# Patient Record
Sex: FEMALE | Race: WHITE | Employment: FULL TIME | URBAN - METROPOLITAN AREA
[De-identification: names, ages, dates, MRNs, and addresses within clinical notes are randomized per-mention and may not be internally consistent; named-entity substitution may affect disease eponyms.]

---

## 2017-09-08 ENCOUNTER — OFFICE VISIT (OUTPATIENT)
Dept: FAMILY MEDICINE CLINIC | Age: 32
End: 2017-09-08
Payer: COMMERCIAL

## 2017-09-08 VITALS
HEIGHT: 66 IN | WEIGHT: 191.2 LBS | RESPIRATION RATE: 12 BRPM | DIASTOLIC BLOOD PRESSURE: 74 MMHG | BODY MASS INDEX: 30.73 KG/M2 | TEMPERATURE: 97.9 F | SYSTOLIC BLOOD PRESSURE: 110 MMHG | HEART RATE: 72 BPM

## 2017-09-08 DIAGNOSIS — R42 DIZZINESS: ICD-10-CM

## 2017-09-08 DIAGNOSIS — E88.81 INSULIN RESISTANCE: ICD-10-CM

## 2017-09-08 DIAGNOSIS — Z01.419 ENCOUNTER FOR GYNECOLOGICAL EXAMINATION WITHOUT ABNORMAL FINDING: Primary | ICD-10-CM

## 2017-09-08 PROCEDURE — 99395 PREV VISIT EST AGE 18-39: CPT | Performed by: FAMILY MEDICINE

## 2017-09-08 RX ORDER — METFORMIN HYDROCHLORIDE 500 MG/1
500 TABLET, EXTENDED RELEASE ORAL
Qty: 90 TABLET | Refills: 3 | Status: SHIPPED | OUTPATIENT
Start: 2017-09-08 | End: 2017-11-15

## 2017-09-08 ASSESSMENT — ENCOUNTER SYMPTOMS
VOMITING: 0
EYES NEGATIVE: 1
SHORTNESS OF BREATH: 0
ABDOMINAL PAIN: 0
DIARRHEA: 0
NAUSEA: 0
BLOOD IN STOOL: 0

## 2017-09-08 ASSESSMENT — PATIENT HEALTH QUESTIONNAIRE - PHQ9
SUM OF ALL RESPONSES TO PHQ9 QUESTIONS 1 & 2: 0
SUM OF ALL RESPONSES TO PHQ QUESTIONS 1-9: 0
1. LITTLE INTEREST OR PLEASURE IN DOING THINGS: 0
2. FEELING DOWN, DEPRESSED OR HOPELESS: 0

## 2017-09-12 ENCOUNTER — TELEPHONE (OUTPATIENT)
Dept: FAMILY MEDICINE CLINIC | Age: 32
End: 2017-09-12

## 2017-09-12 LAB — GYNECOLOGY CYTOLOGY REPORT: NORMAL

## 2017-09-13 ENCOUNTER — HOSPITAL ENCOUNTER (OUTPATIENT)
Age: 32
Discharge: HOME | End: 2017-09-13
Payer: COMMERCIAL

## 2017-09-13 DIAGNOSIS — R42 DIZZINESS: ICD-10-CM

## 2017-09-13 DIAGNOSIS — E88.81 INSULIN RESISTANCE: ICD-10-CM

## 2017-09-13 LAB
ANION GAP SERPL CALCULATED.3IONS-SCNC: 13 MEQ/L (ref 8–16)
AVERAGE GLUCOSE: 90 MG/DL (ref 70–126)
BASOPHILS # BLD: 0.3 %
BASOPHILS ABSOLUTE: 0 THOU/MM3 (ref 0–0.1)
BUN BLDV-MCNC: 19 MG/DL (ref 7–22)
CALCIUM SERPL-MCNC: 9.2 MG/DL (ref 8.5–10.5)
CHLORIDE BLD-SCNC: 103 MEQ/L (ref 98–111)
CO2: 25 MEQ/L (ref 23–33)
CREAT SERPL-MCNC: 0.6 MG/DL (ref 0.4–1.2)
EOSINOPHIL # BLD: 1.7 %
EOSINOPHILS ABSOLUTE: 0.1 THOU/MM3 (ref 0–0.4)
GFR SERPL CREATININE-BSD FRML MDRD: > 90 ML/MIN/1.73M2
GLUCOSE BLD-MCNC: 94 MG/DL (ref 70–108)
HBA1C MFR BLD: 5 % (ref 4.4–6.4)
HCT VFR BLD CALC: 37 % (ref 37–47)
HEMOGLOBIN: 12.5 GM/DL (ref 12–16)
INSULIN, RANDOM OR FASTING: NORMAL
LYMPHOCYTES # BLD: 35 %
LYMPHOCYTES ABSOLUTE: 2.7 THOU/MM3 (ref 1–4.8)
MCH RBC QN AUTO: 29.4 PG (ref 27–31)
MCHC RBC AUTO-ENTMCNC: 33.6 GM/DL (ref 33–37)
MCV RBC AUTO: 87.5 FL (ref 81–99)
MONOCYTES # BLD: 7 %
MONOCYTES ABSOLUTE: 0.5 THOU/MM3 (ref 0.4–1.3)
NUCLEATED RED BLOOD CELLS: 0 /100 WBC
PDW BLD-RTO: 13.1 % (ref 11.5–14.5)
PLATELET # BLD: 257 THOU/MM3 (ref 130–400)
PMV BLD AUTO: 8.6 MCM (ref 7.4–10.4)
POTASSIUM SERPL-SCNC: 4 MEQ/L (ref 3.5–5.2)
RBC # BLD: 4.23 MILL/MM3 (ref 4.2–5.4)
RBC # BLD: NORMAL 10*6/UL
SEG NEUTROPHILS: 56 %
SEGMENTED NEUTROPHILS ABSOLUTE COUNT: 4.3 THOU/MM3 (ref 1.8–7.7)
SODIUM BLD-SCNC: 141 MEQ/L (ref 135–145)
WBC # BLD: 7.6 THOU/MM3 (ref 4.8–10.8)

## 2017-09-13 PROCEDURE — 83036 HEMOGLOBIN GLYCOSYLATED A1C: CPT

## 2017-09-13 PROCEDURE — 85025 COMPLETE CBC W/AUTO DIFF WBC: CPT

## 2017-09-13 PROCEDURE — 83525 ASSAY OF INSULIN: CPT

## 2017-09-13 PROCEDURE — 36415 COLL VENOUS BLD VENIPUNCTURE: CPT

## 2017-09-13 PROCEDURE — 80048 BASIC METABOLIC PNL TOTAL CA: CPT

## 2017-11-15 ENCOUNTER — OFFICE VISIT (OUTPATIENT)
Dept: FAMILY MEDICINE CLINIC | Age: 32
End: 2017-11-15
Payer: COMMERCIAL

## 2017-11-15 VITALS
DIASTOLIC BLOOD PRESSURE: 72 MMHG | WEIGHT: 192.4 LBS | SYSTOLIC BLOOD PRESSURE: 112 MMHG | RESPIRATION RATE: 16 BRPM | TEMPERATURE: 98.3 F | BODY MASS INDEX: 31.05 KG/M2 | HEART RATE: 72 BPM

## 2017-11-15 DIAGNOSIS — J20.9 ACUTE BRONCHITIS, UNSPECIFIED ORGANISM: Primary | ICD-10-CM

## 2017-11-15 PROCEDURE — 1036F TOBACCO NON-USER: CPT | Performed by: FAMILY MEDICINE

## 2017-11-15 PROCEDURE — 99213 OFFICE O/P EST LOW 20 MIN: CPT | Performed by: FAMILY MEDICINE

## 2017-11-15 PROCEDURE — G8484 FLU IMMUNIZE NO ADMIN: HCPCS | Performed by: FAMILY MEDICINE

## 2017-11-15 PROCEDURE — G8417 CALC BMI ABV UP PARAM F/U: HCPCS | Performed by: FAMILY MEDICINE

## 2017-11-15 PROCEDURE — G8427 DOCREV CUR MEDS BY ELIG CLIN: HCPCS | Performed by: FAMILY MEDICINE

## 2017-11-15 RX ORDER — METHYLPREDNISOLONE 4 MG/1
TABLET ORAL
Refills: 0 | COMMUNITY
Start: 2017-11-10 | End: 2018-09-20 | Stop reason: ALTCHOICE

## 2017-11-15 RX ORDER — DOXYCYCLINE HYCLATE 100 MG
100 TABLET ORAL 2 TIMES DAILY
Qty: 20 TABLET | Refills: 0 | Status: SHIPPED | OUTPATIENT
Start: 2017-11-15 | End: 2017-11-25

## 2017-11-15 ASSESSMENT — ENCOUNTER SYMPTOMS
WHEEZING: 1
SHORTNESS OF BREATH: 0
SORE THROAT: 1
SINUS PRESSURE: 1
GASTROINTESTINAL NEGATIVE: 1
COUGH: 1
CHEST TIGHTNESS: 1

## 2018-07-31 ENCOUNTER — OFFICE VISIT (OUTPATIENT)
Dept: FAMILY MEDICINE CLINIC | Age: 33
End: 2018-07-31
Payer: COMMERCIAL

## 2018-07-31 VITALS
TEMPERATURE: 98.1 F | BODY MASS INDEX: 32.28 KG/M2 | SYSTOLIC BLOOD PRESSURE: 122 MMHG | HEART RATE: 84 BPM | DIASTOLIC BLOOD PRESSURE: 80 MMHG | WEIGHT: 200 LBS | RESPIRATION RATE: 16 BRPM

## 2018-07-31 DIAGNOSIS — J01.00 ACUTE NON-RECURRENT MAXILLARY SINUSITIS: Primary | ICD-10-CM

## 2018-07-31 DIAGNOSIS — J20.9 ACUTE BRONCHITIS, UNSPECIFIED ORGANISM: ICD-10-CM

## 2018-07-31 PROCEDURE — 1036F TOBACCO NON-USER: CPT | Performed by: NURSE PRACTITIONER

## 2018-07-31 PROCEDURE — 99213 OFFICE O/P EST LOW 20 MIN: CPT | Performed by: NURSE PRACTITIONER

## 2018-07-31 PROCEDURE — G8417 CALC BMI ABV UP PARAM F/U: HCPCS | Performed by: NURSE PRACTITIONER

## 2018-07-31 PROCEDURE — G8427 DOCREV CUR MEDS BY ELIG CLIN: HCPCS | Performed by: NURSE PRACTITIONER

## 2018-07-31 RX ORDER — CEFDINIR 300 MG/1
300 CAPSULE ORAL 2 TIMES DAILY
Qty: 20 CAPSULE | Refills: 0 | Status: SHIPPED | OUTPATIENT
Start: 2018-07-31 | End: 2018-08-10

## 2018-07-31 ASSESSMENT — ENCOUNTER SYMPTOMS
ABDOMINAL PAIN: 0
NAUSEA: 0
SORE THROAT: 1
SHORTNESS OF BREATH: 0
WHEEZING: 0
COUGH: 1
DIARRHEA: 0
SINUS PRESSURE: 1
RHINORRHEA: 1
CONSTIPATION: 0

## 2018-07-31 NOTE — PROGRESS NOTES
days    Acute bronchitis, unspecified organism  -     cefdinir (OMNICEF) 300 MG capsule; Take 1 capsule by mouth 2 times daily for 10 days        Return if symptoms worsen or fail to improve. Patient instructions given and reviewed.         Electronically signed by ISAIAH Muhammad CNP on 7/31/2018 at 9:05 AM

## 2018-07-31 NOTE — PATIENT INSTRUCTIONS
Patient Education        Sinusitis: Care Instructions  Your Care Instructions    Sinusitis is an infection of the lining of the sinus cavities in your head. Sinusitis often follows a cold. It causes pain and pressure in your head and face. In most cases, sinusitis gets better on its own in 1 to 2 weeks. But some mild symptoms may last for several weeks. Sometimes antibiotics are needed. Follow-up care is a key part of your treatment and safety. Be sure to make and go to all appointments, and call your doctor if you are having problems. It's also a good idea to know your test results and keep a list of the medicines you take. How can you care for yourself at home? · Take an over-the-counter pain medicine, such as acetaminophen (Tylenol), ibuprofen (Advil, Motrin), or naproxen (Aleve). Read and follow all instructions on the label. · If the doctor prescribed antibiotics, take them as directed. Do not stop taking them just because you feel better. You need to take the full course of antibiotics. · Be careful when taking over-the-counter cold or flu medicines and Tylenol at the same time. Many of these medicines have acetaminophen, which is Tylenol. Read the labels to make sure that you are not taking more than the recommended dose. Too much acetaminophen (Tylenol) can be harmful. · Breathe warm, moist air from a steamy shower, a hot bath, or a sink filled with hot water. Avoid cold, dry air. Using a humidifier in your home may help. Follow the directions for cleaning the machine. · Use saline (saltwater) nasal washes to help keep your nasal passages open and wash out mucus and bacteria. You can buy saline nose drops at a grocery store or drugstore. Or you can make your own at home by adding 1 teaspoon of salt and 1 teaspoon of baking soda to 2 cups of distilled water. If you make your own, fill a bulb syringe with the solution, insert the tip into your nostril, and squeeze gently. Sherly Chowdary your nose.   · Put a hot, wet towel or a warm gel pack on your face 3 or 4 times a day for 5 to 10 minutes each time. · Try a decongestant nasal spray like oxymetazoline (Afrin). Do not use it for more than 3 days in a row. Using it for more than 3 days can make your congestion worse. When should you call for help? Call your doctor now or seek immediate medical care if:    · You have new or worse swelling or redness in your face or around your eyes.     · You have a new or higher fever.    Watch closely for changes in your health, and be sure to contact your doctor if:    · You have new or worse facial pain.     · The mucus from your nose becomes thicker (like pus) or has new blood in it.     · You are not getting better as expected. Where can you learn more? Go to https://[employer]. org and sign in to your [employer]ac[employer] account. Enter I346 in the [employer] box to learn more about \"Sinusitis: Care Instructions. \"     If you do not have an account, please click on the \"Sign Up Now\" link. Current as of: May 12, 2017  Content Version: 11.6  © 0404-8885 [employer]. Care instructions adapted under license by [facility] ([facility]). If you have questions about a medical condition or this instruction, always ask your healthcare professional. [address] any warranty or liability for your use of this information.

## 2018-09-20 ENCOUNTER — OFFICE VISIT (OUTPATIENT)
Dept: FAMILY MEDICINE CLINIC | Age: 33
End: 2018-09-20
Payer: COMMERCIAL

## 2018-09-20 VITALS
SYSTOLIC BLOOD PRESSURE: 114 MMHG | DIASTOLIC BLOOD PRESSURE: 68 MMHG | HEART RATE: 68 BPM | RESPIRATION RATE: 12 BRPM | HEIGHT: 67 IN | WEIGHT: 196.8 LBS | BODY MASS INDEX: 30.89 KG/M2

## 2018-09-20 DIAGNOSIS — R06.83 SNORING: ICD-10-CM

## 2018-09-20 DIAGNOSIS — Z00.00 ANNUAL PHYSICAL EXAM: Primary | ICD-10-CM

## 2018-09-20 DIAGNOSIS — N92.6 IRREGULAR MENSES: ICD-10-CM

## 2018-09-20 DIAGNOSIS — E88.81 INSULIN RESISTANCE: ICD-10-CM

## 2018-09-20 PROCEDURE — 99395 PREV VISIT EST AGE 18-39: CPT | Performed by: FAMILY MEDICINE

## 2018-09-20 ASSESSMENT — PATIENT HEALTH QUESTIONNAIRE - PHQ9
2. FEELING DOWN, DEPRESSED OR HOPELESS: 0
SUM OF ALL RESPONSES TO PHQ QUESTIONS 1-9: 0
SUM OF ALL RESPONSES TO PHQ9 QUESTIONS 1 & 2: 0
SUM OF ALL RESPONSES TO PHQ QUESTIONS 1-9: 0
1. LITTLE INTEREST OR PLEASURE IN DOING THINGS: 0

## 2018-09-20 ASSESSMENT — ENCOUNTER SYMPTOMS
GASTROINTESTINAL NEGATIVE: 1
RESPIRATORY NEGATIVE: 1

## 2018-09-21 ENCOUNTER — HOSPITAL ENCOUNTER (OUTPATIENT)
Age: 33
Discharge: HOME | End: 2018-09-21
Payer: COMMERCIAL

## 2018-09-21 DIAGNOSIS — N92.6 IRREGULAR MENSES: ICD-10-CM

## 2018-09-21 DIAGNOSIS — E88.81 INSULIN RESISTANCE: ICD-10-CM

## 2018-09-21 LAB
AVERAGE GLUCOSE: 96 MG/DL (ref 70–126)
HBA1C MFR BLD: 5.2 % (ref 4.4–6.4)
T4 FREE: 1.09 NG/DL (ref 0.93–1.76)
TSH SERPL DL<=0.05 MIU/L-ACNC: 3.6 UIU/ML (ref 0.4–4.2)

## 2018-09-21 PROCEDURE — 83036 HEMOGLOBIN GLYCOSYLATED A1C: CPT

## 2018-09-21 PROCEDURE — 83525 ASSAY OF INSULIN: CPT

## 2018-09-21 PROCEDURE — 84443 ASSAY THYROID STIM HORMONE: CPT

## 2018-09-21 PROCEDURE — 36415 COLL VENOUS BLD VENIPUNCTURE: CPT

## 2018-09-21 PROCEDURE — 84439 ASSAY OF FREE THYROXINE: CPT

## 2018-09-22 LAB — INSULIN, RANDOM OR FASTING: NORMAL

## 2018-09-24 ENCOUNTER — TELEPHONE (OUTPATIENT)
Dept: FAMILY MEDICINE CLINIC | Age: 33
End: 2018-09-24

## 2018-11-19 ENCOUNTER — INITIAL CONSULT (OUTPATIENT)
Dept: PULMONOLOGY | Age: 33
End: 2018-11-19
Payer: COMMERCIAL

## 2018-11-19 VITALS
SYSTOLIC BLOOD PRESSURE: 124 MMHG | HEART RATE: 75 BPM | HEIGHT: 66 IN | BODY MASS INDEX: 32.3 KG/M2 | DIASTOLIC BLOOD PRESSURE: 76 MMHG | OXYGEN SATURATION: 100 % | WEIGHT: 201 LBS

## 2018-11-19 DIAGNOSIS — R06.83 SNORING: ICD-10-CM

## 2018-11-19 DIAGNOSIS — E66.9 OBESITY (BMI 30-39.9): ICD-10-CM

## 2018-11-19 DIAGNOSIS — G47.9 SLEEP DISTURBANCE: ICD-10-CM

## 2018-11-19 DIAGNOSIS — G47.10 HYPERSOMNIA: Primary | ICD-10-CM

## 2018-11-19 PROCEDURE — G8484 FLU IMMUNIZE NO ADMIN: HCPCS | Performed by: INTERNAL MEDICINE

## 2018-11-19 PROCEDURE — G8417 CALC BMI ABV UP PARAM F/U: HCPCS | Performed by: INTERNAL MEDICINE

## 2018-11-19 PROCEDURE — 99203 OFFICE O/P NEW LOW 30 MIN: CPT | Performed by: INTERNAL MEDICINE

## 2018-11-19 PROCEDURE — 1036F TOBACCO NON-USER: CPT | Performed by: INTERNAL MEDICINE

## 2018-11-19 PROCEDURE — G8427 DOCREV CUR MEDS BY ELIG CLIN: HCPCS | Performed by: INTERNAL MEDICINE

## 2018-12-10 ENCOUNTER — TELEPHONE (OUTPATIENT)
Dept: SLEEP CENTER | Age: 33
End: 2018-12-10

## 2018-12-11 DIAGNOSIS — G47.10 HYPERSOMNIA: Primary | ICD-10-CM

## 2019-01-07 ENCOUNTER — HOSPITAL ENCOUNTER (OUTPATIENT)
Dept: SLEEP CENTER | Age: 34
Discharge: HOME | End: 2019-01-09
Payer: COMMERCIAL

## 2019-01-07 DIAGNOSIS — G47.10 HYPERSOMNIA: ICD-10-CM

## 2019-01-07 PROCEDURE — 95806 SLEEP STUDY UNATT&RESP EFFT: CPT

## 2019-01-08 LAB — STATUS: NORMAL

## 2019-01-10 ENCOUNTER — OFFICE VISIT (OUTPATIENT)
Dept: PULMONOLOGY | Age: 34
End: 2019-01-10
Payer: COMMERCIAL

## 2019-01-10 VITALS
BODY MASS INDEX: 32.62 KG/M2 | OXYGEN SATURATION: 98 % | DIASTOLIC BLOOD PRESSURE: 66 MMHG | HEART RATE: 79 BPM | HEIGHT: 66 IN | WEIGHT: 203 LBS | SYSTOLIC BLOOD PRESSURE: 122 MMHG

## 2019-01-10 DIAGNOSIS — G47.33 OSA (OBSTRUCTIVE SLEEP APNEA): Primary | ICD-10-CM

## 2019-01-10 DIAGNOSIS — E66.9 OBESITY (BMI 30-39.9): ICD-10-CM

## 2019-01-10 PROCEDURE — G8427 DOCREV CUR MEDS BY ELIG CLIN: HCPCS | Performed by: PHYSICIAN ASSISTANT

## 2019-01-10 PROCEDURE — G8417 CALC BMI ABV UP PARAM F/U: HCPCS | Performed by: PHYSICIAN ASSISTANT

## 2019-01-10 PROCEDURE — 99214 OFFICE O/P EST MOD 30 MIN: CPT | Performed by: PHYSICIAN ASSISTANT

## 2019-01-10 PROCEDURE — 1036F TOBACCO NON-USER: CPT | Performed by: PHYSICIAN ASSISTANT

## 2019-01-10 PROCEDURE — G8484 FLU IMMUNIZE NO ADMIN: HCPCS | Performed by: PHYSICIAN ASSISTANT

## 2019-01-10 ASSESSMENT — ENCOUNTER SYMPTOMS
EYES NEGATIVE: 1
NAUSEA: 0
CHEST TIGHTNESS: 0
COUGH: 0
DIARRHEA: 0
SHORTNESS OF BREATH: 0
ALLERGIC/IMMUNOLOGIC NEGATIVE: 1
WHEEZING: 0
BACK PAIN: 0
STRIDOR: 0

## 2019-01-15 DIAGNOSIS — G47.33 OSA (OBSTRUCTIVE SLEEP APNEA): Primary | ICD-10-CM

## 2019-03-28 ENCOUNTER — OFFICE VISIT (OUTPATIENT)
Dept: PULMONOLOGY | Age: 34
End: 2019-03-28
Payer: COMMERCIAL

## 2019-03-28 VITALS
HEIGHT: 66 IN | OXYGEN SATURATION: 99 % | DIASTOLIC BLOOD PRESSURE: 76 MMHG | BODY MASS INDEX: 31.82 KG/M2 | HEART RATE: 76 BPM | SYSTOLIC BLOOD PRESSURE: 116 MMHG | WEIGHT: 198 LBS

## 2019-03-28 DIAGNOSIS — E66.9 OBESITY (BMI 30-39.9): ICD-10-CM

## 2019-03-28 DIAGNOSIS — G47.33 OSA (OBSTRUCTIVE SLEEP APNEA): Primary | ICD-10-CM

## 2019-03-28 PROCEDURE — G8417 CALC BMI ABV UP PARAM F/U: HCPCS | Performed by: PHYSICIAN ASSISTANT

## 2019-03-28 PROCEDURE — 99213 OFFICE O/P EST LOW 20 MIN: CPT | Performed by: PHYSICIAN ASSISTANT

## 2019-03-28 PROCEDURE — G8484 FLU IMMUNIZE NO ADMIN: HCPCS | Performed by: PHYSICIAN ASSISTANT

## 2019-03-28 PROCEDURE — G8427 DOCREV CUR MEDS BY ELIG CLIN: HCPCS | Performed by: PHYSICIAN ASSISTANT

## 2019-03-28 PROCEDURE — 1036F TOBACCO NON-USER: CPT | Performed by: PHYSICIAN ASSISTANT

## 2019-03-28 ASSESSMENT — ENCOUNTER SYMPTOMS
DIARRHEA: 0
BACK PAIN: 0
EYES NEGATIVE: 1
STRIDOR: 0
COUGH: 0
ALLERGIC/IMMUNOLOGIC NEGATIVE: 1
NAUSEA: 0
SHORTNESS OF BREATH: 0
WHEEZING: 0
CHEST TIGHTNESS: 0

## 2019-09-30 ENCOUNTER — OFFICE VISIT (OUTPATIENT)
Dept: PULMONOLOGY | Age: 34
End: 2019-09-30
Payer: COMMERCIAL

## 2019-09-30 VITALS
DIASTOLIC BLOOD PRESSURE: 70 MMHG | HEIGHT: 66 IN | WEIGHT: 203.4 LBS | OXYGEN SATURATION: 98 % | BODY MASS INDEX: 32.69 KG/M2 | RESPIRATION RATE: 16 BRPM | HEART RATE: 70 BPM | SYSTOLIC BLOOD PRESSURE: 118 MMHG

## 2019-09-30 DIAGNOSIS — G47.33 OSA (OBSTRUCTIVE SLEEP APNEA): Primary | ICD-10-CM

## 2019-09-30 DIAGNOSIS — E66.9 OBESITY (BMI 30-39.9): ICD-10-CM

## 2019-09-30 PROCEDURE — 1036F TOBACCO NON-USER: CPT | Performed by: PHYSICIAN ASSISTANT

## 2019-09-30 PROCEDURE — G8427 DOCREV CUR MEDS BY ELIG CLIN: HCPCS | Performed by: PHYSICIAN ASSISTANT

## 2019-09-30 PROCEDURE — G8417 CALC BMI ABV UP PARAM F/U: HCPCS | Performed by: PHYSICIAN ASSISTANT

## 2019-09-30 PROCEDURE — 99213 OFFICE O/P EST LOW 20 MIN: CPT | Performed by: PHYSICIAN ASSISTANT

## 2019-09-30 ASSESSMENT — ENCOUNTER SYMPTOMS
SHORTNESS OF BREATH: 0
STRIDOR: 0
CHEST TIGHTNESS: 0
NAUSEA: 0
COUGH: 0
EYES NEGATIVE: 1
DIARRHEA: 0
ALLERGIC/IMMUNOLOGIC NEGATIVE: 1
WHEEZING: 0
BACK PAIN: 0

## 2019-10-01 ENCOUNTER — TELEPHONE (OUTPATIENT)
Dept: PULMONOLOGY | Age: 34
End: 2019-10-01

## 2020-07-06 ENCOUNTER — OFFICE VISIT (OUTPATIENT)
Dept: FAMILY MEDICINE CLINIC | Age: 35
End: 2020-07-06
Payer: COMMERCIAL

## 2020-07-06 VITALS
BODY MASS INDEX: 20.56 KG/M2 | SYSTOLIC BLOOD PRESSURE: 106 MMHG | HEIGHT: 67 IN | HEART RATE: 88 BPM | RESPIRATION RATE: 16 BRPM | TEMPERATURE: 98.4 F | DIASTOLIC BLOOD PRESSURE: 62 MMHG | WEIGHT: 131 LBS

## 2020-07-06 PROBLEM — E66.9 OBESITY (BMI 30-39.9): Status: RESOLVED | Noted: 2019-01-10 | Resolved: 2020-07-06

## 2020-07-06 PROBLEM — G47.33 OSA (OBSTRUCTIVE SLEEP APNEA): Status: RESOLVED | Noted: 2019-01-10 | Resolved: 2020-07-06

## 2020-07-06 PROCEDURE — 99395 PREV VISIT EST AGE 18-39: CPT | Performed by: FAMILY MEDICINE

## 2020-07-06 SDOH — ECONOMIC STABILITY: FOOD INSECURITY: WITHIN THE PAST 12 MONTHS, THE FOOD YOU BOUGHT JUST DIDN'T LAST AND YOU DIDN'T HAVE MONEY TO GET MORE.: NEVER TRUE

## 2020-07-06 SDOH — ECONOMIC STABILITY: TRANSPORTATION INSECURITY
IN THE PAST 12 MONTHS, HAS LACK OF TRANSPORTATION KEPT YOU FROM MEETINGS, WORK, OR FROM GETTING THINGS NEEDED FOR DAILY LIVING?: NO

## 2020-07-06 SDOH — ECONOMIC STABILITY: FOOD INSECURITY: WITHIN THE PAST 12 MONTHS, YOU WORRIED THAT YOUR FOOD WOULD RUN OUT BEFORE YOU GOT MONEY TO BUY MORE.: NEVER TRUE

## 2020-07-06 SDOH — ECONOMIC STABILITY: TRANSPORTATION INSECURITY
IN THE PAST 12 MONTHS, HAS THE LACK OF TRANSPORTATION KEPT YOU FROM MEDICAL APPOINTMENTS OR FROM GETTING MEDICATIONS?: NO

## 2020-07-06 SDOH — ECONOMIC STABILITY: INCOME INSECURITY: HOW HARD IS IT FOR YOU TO PAY FOR THE VERY BASICS LIKE FOOD, HOUSING, MEDICAL CARE, AND HEATING?: NOT HARD AT ALL

## 2020-07-06 ASSESSMENT — ENCOUNTER SYMPTOMS
VOMITING: 0
ABDOMINAL PAIN: 0
BLOOD IN STOOL: 0
SHORTNESS OF BREATH: 0
EYES NEGATIVE: 1
NAUSEA: 0
CONSTIPATION: 1
DIARRHEA: 0

## 2020-07-06 ASSESSMENT — PATIENT HEALTH QUESTIONNAIRE - PHQ9
SUM OF ALL RESPONSES TO PHQ9 QUESTIONS 1 & 2: 0
1. LITTLE INTEREST OR PLEASURE IN DOING THINGS: 0
2. FEELING DOWN, DEPRESSED OR HOPELESS: 0
SUM OF ALL RESPONSES TO PHQ QUESTIONS 1-9: 0
SUM OF ALL RESPONSES TO PHQ QUESTIONS 1-9: 0

## 2020-07-06 NOTE — PROGRESS NOTES
Chief Complaint   Patient presents with    Annual Exam       SUBJECTIVE     Karlyne Osler is a 37 y. o.female    Pt presents for annual wellness physical exam.        Pt stable since last visit- no new problems for diagnoses listed below:  Patient Active Problem List   Diagnosis    Insulin resistance    NICOLE (obstructive sleep apnea)    Obesity (BMI 30-39. 9)     She has lost 75# with diet and exercise over the last 9 months. She feels great. Her sleep apnea is gone and she has more energy. She is walking for exercise. Takes no Rx meds. Her family history is updated. Nonsmoker. Body mass index is 20.52 kg/m². Her menses are somewhat irregular and she states that they always have been. No h/o abnormal pap. Review of Systems   Constitutional: Negative for chills, fatigue and fever. HENT: Negative. Eyes: Negative. Respiratory: Negative for shortness of breath. Cardiovascular: Negative for chest pain, palpitations and leg swelling. Gastrointestinal: Positive for constipation. Negative for abdominal pain, blood in stool, diarrhea, nausea and vomiting. Genitourinary: Negative for dysuria and menstrual problem. Musculoskeletal: Negative. Skin: Negative for rash. Neurological: Negative for dizziness and headaches. Hematological: Negative. Psychiatric/Behavioral: Negative. All other systems reviewed and are negative. OBJECTIVE     /62   Pulse 88   Temp 98.4 °F (36.9 °C)   Resp 16   Ht 5' 7\" (1.702 m)   Wt 131 lb (59.4 kg)   BMI 20.52 kg/m²     Wt Readings from Last 3 Encounters:   07/06/20 131 lb (59.4 kg)   09/30/19 203 lb 6.4 oz (92.3 kg)   03/28/19 198 lb (89.8 kg)       Physical Exam  Vitals signs and nursing note reviewed. Constitutional:       General: She is not in acute distress. Appearance: She is well-developed. HENT:      Head: Normocephalic and atraumatic.       Right Ear: Tympanic membrane normal.      Left Ear: Tympanic membrane normal.      Mouth/Throat:      Mouth: Mucous membranes are moist.      Pharynx: No posterior oropharyngeal erythema. Eyes:      Conjunctiva/sclera: Conjunctivae normal.   Neck:      Musculoskeletal: Neck supple. Thyroid: No thyromegaly. Cardiovascular:      Rate and Rhythm: Normal rate and regular rhythm. Heart sounds: No murmur. Pulmonary:      Effort: Pulmonary effort is normal.      Breath sounds: Normal breath sounds. No wheezing. Abdominal:      General: Bowel sounds are normal.      Palpations: Abdomen is soft. Tenderness: There is no abdominal tenderness. There is no guarding or rebound. Genitourinary:     General: Normal vulva. Vagina: No vaginal discharge. Comments: External genitalia normal.  Spec exam reveals parous cervical os without lesion. Pap  Collected. Bimanual exam within normal limits. Breasts are of normal shape and contour. No masses or lumps palpated. No axillary or supraclavicular LA noted. Musculoskeletal:      Right lower leg: No edema. Left lower leg: No edema. Lymphadenopathy:      Cervical: No cervical adenopathy. Skin:     General: Skin is warm and dry. Findings: No rash. Neurological:      Mental Status: She is alert and oriented to person, place, and time. Psychiatric:         Behavior: Behavior normal.             Immunization History   Administered Date(s) Administered    Tdap (Boostrix, Adacel) 02/20/2011         Health Maintenance   Topic Date Due    Flu vaccine (1) 09/01/2020    Cervical cancer screen  09/08/2020    DTaP/Tdap/Td vaccine (2 - Td) 02/20/2021    HIV screen  Completed    Hepatitis A vaccine  Aged Out    Hepatitis B vaccine  Aged Out    Hib vaccine  Aged Out    Meningococcal (ACWY) vaccine  Aged Out    Pneumococcal 0-64 years Vaccine  Aged Out    Varicella vaccine  Discontinued         ASSESSMENT      1.  Encounter for gynecological examination without abnormal finding        PLAN        Orders Placed This Encounter   Procedures    PAP SMEAR     Patient History:    No LMP recorded. OBGYN Status: Having periods  Past Surgical History:  No date: COLONOSCOPY  No date: TONSILLECTOMY      Social History    Tobacco Use      Smoking status: Never Smoker      Smokeless tobacco: Never Used       Order Specific Question:   Collection Type     Answer: Thin Prep     Order Specific Question:   Prior Abnormal Pap Test     Answer:   No     Order Specific Question:   Screening or Diagnostic     Answer:   Screening     Order Specific Question:   HPV Requested? Answer:   Yes -  If ASCUS Reflex HPV     Order Specific Question:   High Risk Patient     Answer:   N/A       Pap to lab.     Healthy diet and exercise suggested    Start mammograms at age 40yo    Gilford Reilly received counseling on the following healthy behaviors: nutrition and exercise          Electronically signed by Dat Ty MD on 7/6/2020 at 4:22 PM

## 2020-07-16 LAB — CYTOLOGY THIN PREP PAP: NORMAL

## 2020-08-05 ENCOUNTER — TELEPHONE (OUTPATIENT)
Dept: FAMILY MEDICINE CLINIC | Age: 35
End: 2020-08-05

## 2020-08-05 NOTE — TELEPHONE ENCOUNTER
Not related to her abnormal pap. Could be related to her recent weight loss. If she's concerned, can check TSH, free T4, FSH, CBC, CMP due to absence of menses.   CG

## 2020-08-12 NOTE — TELEPHONE ENCOUNTER
Pt left message at 852am stating she would like to go ahead with labs, will go to [facility]  Call back when done  Orders printed and faxed    Left message for pt order was faxed and 12 hour fasting

## 2020-08-20 LAB — FOLLICLE STIMULATING HORMONE: 4.7 MIU/ML (ref 0.6–16.9)

## 2020-08-21 ENCOUNTER — PATIENT MESSAGE (OUTPATIENT)
Dept: FAMILY MEDICINE CLINIC | Age: 35
End: 2020-08-21

## 2020-08-21 NOTE — TELEPHONE ENCOUNTER
From: Mirlande Gandhi  To: Selma Coffey MD  Sent: 8/21/2020 8:25 AM EDT  Subject: Test Results Question    I see that my bloodwork came back yesterday. I only see the results for Follicle Stimulating Hormone. The voicemail I received from your office stated that it would test my Thyroid, etc. Are there more results to come or do I need to have additional bloodwork done? Thank you!

## 2020-08-21 NOTE — TELEPHONE ENCOUNTER
Spoke with [address]. The tech there states that they are only accessing the order for the [facility]. They cannot add on additional labs to the serum they have, patient would have to come back for another draw. Patient notified and will go next week. Orders faxed to Elvira Thomason @ [phone].

## 2020-08-25 LAB
ALBUMIN SERPL-MCNC: 4.4 G/DL (ref 3.5–5)
ALBUMIN/GLOBULIN RATIO: 2.1 (ref 1.2–1.5)
ALK PHOSPHATASE: 47 U/L (ref 38–126)
ALT SERPL-CCNC: 25 U/L (ref 7–35)
AST SERPL-CCNC: 19 U/L (ref 10–42)
BILIRUB SERPL-MCNC: 0.5 MG/DL (ref 0.3–1.2)
BUN BLDV-MCNC: 24 MG/DL (ref 7–22)
CALCIUM SERPL-MCNC: 9.5 MG/DL (ref 8.4–10.2)
CHLORIDE BLD-SCNC: 106 MEQ/L (ref 98–107)
CO2: 27 MEQ/L (ref 22–31)
CREAT SERPL-MCNC: 0.7 MG/DL (ref 0.4–1.1)
GFR SERPL CREATININE-BSD FRML MDRD: >=60 ML/MIN/{1.73_M2}
GLOBULIN: 2.1 G/DL (ref 2.9–3.3)
GLUCOSE: 79 MG/DL (ref 70–126)
HCT VFR BLD CALC: 37.8 % (ref 37–47)
HEMOGLOBIN: 12.6 G/DL (ref 12–16)
MCH RBC QN AUTO: 30.6 PG (ref 28–32)
MCHC RBC AUTO-ENTMCNC: 33.4 G/DL (ref 33–37)
MCV RBC AUTO: 91.6 FL (ref 81–99)
PDW BLD-RTO: 13.8 % (ref 11.5–14.5)
PLATELET # BLD: 218 THOU/CUMM (ref 130–400)
POTASSIUM SERPL-SCNC: 4.2 MEQ/L (ref 3.5–5.1)
RBC: 4.13 MIL/CUMM (ref 4.2–5.4)
SODIUM BLD-SCNC: 140 MEQ/L (ref 136–145)
T4 FREE: 0.72 NG/DL (ref 0.61–1.12)
TOTAL PROTEIN: 6.5 G/DL (ref 6.4–8.3)
TSH SERPL DL<=0.05 MIU/L-ACNC: 13.47 UIU/ML (ref 0.49–4.67)
WBC: 4.8 THOU/CUMM (ref 4.8–10.8)

## 2020-08-26 ENCOUNTER — TELEPHONE (OUTPATIENT)
Dept: FAMILY MEDICINE CLINIC | Age: 35
End: 2020-08-26

## 2020-08-26 RX ORDER — LEVOTHYROXINE SODIUM 0.05 MG/1
50 TABLET ORAL DAILY
Qty: 30 TABLET | Refills: 1 | Status: SHIPPED | OUTPATIENT
Start: 2020-08-26 | End: 2020-11-03 | Stop reason: ALTCHOICE

## 2020-08-26 NOTE — TELEPHONE ENCOUNTER
----- Message from Marco Issa MD sent at 8/21/2020  9:09 AM EDT -----  [facility] normal.  Please call lab and check to see where the remainder of the results are.  CG

## 2020-08-26 NOTE — TELEPHONE ENCOUNTER
Spoke to the pt and notified her of the lab results and CG recommendations and she is agreeable. Rx for Synthroid per order of CG was EP'd to [facility]. Lab orders mailed to the pt to get done in 6 weeks.

## 2020-09-30 ENCOUNTER — OFFICE VISIT (OUTPATIENT)
Dept: PULMONOLOGY | Age: 35
End: 2020-09-30
Payer: COMMERCIAL

## 2020-09-30 VITALS
WEIGHT: 131.4 LBS | OXYGEN SATURATION: 100 % | TEMPERATURE: 97.8 F | HEIGHT: 67 IN | HEART RATE: 47 BPM | BODY MASS INDEX: 20.62 KG/M2 | DIASTOLIC BLOOD PRESSURE: 72 MMHG | SYSTOLIC BLOOD PRESSURE: 102 MMHG

## 2020-09-30 PROCEDURE — G8420 CALC BMI NORM PARAMETERS: HCPCS | Performed by: PHYSICIAN ASSISTANT

## 2020-09-30 PROCEDURE — G8427 DOCREV CUR MEDS BY ELIG CLIN: HCPCS | Performed by: PHYSICIAN ASSISTANT

## 2020-09-30 PROCEDURE — 99213 OFFICE O/P EST LOW 20 MIN: CPT | Performed by: PHYSICIAN ASSISTANT

## 2020-09-30 PROCEDURE — 1036F TOBACCO NON-USER: CPT | Performed by: PHYSICIAN ASSISTANT

## 2020-09-30 ASSESSMENT — ENCOUNTER SYMPTOMS
EYES NEGATIVE: 1
ALLERGIC/IMMUNOLOGIC NEGATIVE: 1
SHORTNESS OF BREATH: 0
DIARRHEA: 0
WHEEZING: 0
BACK PAIN: 0
NAUSEA: 0
CHEST TIGHTNESS: 0
COUGH: 0
STRIDOR: 0

## 2020-09-30 NOTE — PROGRESS NOTES
[facility] Sleep [facility]      [facility] [employer]         [id number]  9/30/2020   Chief Complaint   Patient presents with    Follow-up     Pt lost weight, has not been using machine, wants new sleep study        Pt of Dr. Ric Chávez    PAP Download:   Original or initial AHI: 18.8     Date of initial study: 1/17/2019      Neck Size: 17  Mallampati Mallampati 4  ESS:  8  SAQLI:     Presentation:   Yue Dupree presents for sleep medicine follow up for obstructive sleep apnea  Since the last visit, Yue Dupree is not wearing PAP since loosing weight. She was not able to tolerate PAP after loosing weight. She is not snoring. Only sleeping 5 hours. She is not tired during the day. She has been noticing increased SOB with exercise and can not get a deep breath. She has improvement with using albuterol 2 puffs prior to exercising. Sleep issues:  Do you feel better? Yes  More rested? Yes   Better concentration? yes    Progress History:   Since last visit any new medical issues? Yes hypothyroid  New ER or hospitlal visits? No  Any new or changes in medicines? No  Any new sleep medicines? No        Past Medical History:   Diagnosis Date    Insulin resistance     Sleep apnea        Past Surgical History:   Procedure Laterality Date    COLONOSCOPY      TONSILLECTOMY         Social History     Tobacco Use    Smoking status: Never Smoker    Smokeless tobacco: Never Used   Substance Use Topics    Alcohol use: No     Comment: occ    Drug use: No       No Known Allergies    Current Outpatient Medications   Medication Sig Dispense Refill    levothyroxine (SYNTHROID) 50 MCG tablet Take 1 tablet by mouth Daily 30 tablet 1     No current facility-administered medications for this visit.         Family History   Problem Relation Age of Onset    Diabetes Maternal Grandmother     Heart Disease Maternal Grandmother     High Blood Pressure Maternal Grandmother     Heart Attack Maternal Grandmother     breath sounds. Musculoskeletal: Normal range of motion. Skin:     General: Skin is warm and dry. Neurological:      Mental Status: She is alert and oriented to person, place, and time. Psychiatric:         Behavior: Behavior normal.         Thought Content: Thought content normal.         Judgment: Judgment normal.       ASSESSMENT/DIAGNOSIS     Diagnosis Orders   1. NICOLE (obstructive sleep apnea)  Home Sleep Study   2. Exercise-induced asthma            Plan   - Will get HST to re-eval NICOLE  - Suspect NICOLE resolved since weight loss and symptoms have resolved.    - Follow up after HST  - Recommend using albuterol prior to exercise    Vianey Patiño PA-C, MPAS  9/30/2020

## 2020-10-27 ENCOUNTER — HOSPITAL ENCOUNTER (OUTPATIENT)
Dept: SLEEP CENTER | Age: 35
Discharge: HOME | End: 2020-10-29
Payer: COMMERCIAL

## 2020-10-27 ENCOUNTER — NURSE ONLY (OUTPATIENT)
Dept: LAB | Age: 35
End: 2020-10-27

## 2020-10-27 LAB
T3 TOTAL: 63 NG/DL (ref 72–181)
T4 FREE: 1.08 NG/DL (ref 0.93–1.76)
TSH SERPL DL<=0.05 MIU/L-ACNC: 15.84 UIU/ML (ref 0.4–4.2)

## 2020-10-27 PROCEDURE — 95806 SLEEP STUDY UNATT&RESP EFFT: CPT

## 2020-10-27 NOTE — PROGRESS NOTES
Janet Fernández presents today for a HST instruction and demonstration on unit # 230. Questions were asked and answers given. She was able to return demonstration and verbalized understanding. The sleep center control room phone number was provided incase questions arise during her study. Informed patient to call 911 in case of an emergency. She states she will return the unit tomorrow. Temp. WNL  Neg. Covid Screen.

## 2020-10-28 LAB
MISC. #1 REFERENCE GROUP TEST: NORMAL
STATUS: NORMAL

## 2020-10-30 ENCOUNTER — TELEPHONE (OUTPATIENT)
Dept: FAMILY MEDICINE CLINIC | Age: 35
End: 2020-10-30

## 2020-10-30 LAB — T3 REVERSE: 14.6 NG/DL (ref 9–27)

## 2020-10-30 NOTE — TELEPHONE ENCOUNTER
Have her make an appt with endo.   If appt is not soon, have her let us know so that we can make some med adjustments while she's waiting to get in. CG

## 2020-10-30 NOTE — TELEPHONE ENCOUNTER
----- Message from Barbie Martinez MD sent at 10/29/2020  4:34 PM EDT -----  Notify her that her TSH is elevated at 15, free T4 is normal, serum T3 minimally low. See if she's planning on addressing these findings with an endocrinologist soon. If not, will need to increase her synthroid or change her to [employer] Thyroid to supplement both T3 and T4.   CG

## 2020-11-02 ENCOUNTER — TELEPHONE (OUTPATIENT)
Dept: FAMILY MEDICINE CLINIC | Age: 35
End: 2020-11-02

## 2020-11-02 NOTE — TELEPHONE ENCOUNTER
Pt left message at 1227pm  She found a physician in [place], would like ref to Dr Veronica French, need to fax referral then she can get scheduled  Fax [phone]    Please call back pt, so she knows her message was received  [phone] [address] for referral?

## 2020-11-09 NOTE — PROGRESS NOTES
[address] [place] [address]                               SLEEP STUDY REPORT    PATIENT NAME: Justice Ortiz                   :        1985  MED REC NO:   [id number]                           ROOM:  ACCOUNT NO:   [de-identified]                           ADMIT DATE: 10/27/2020  PROVIDER:     Mario Houston M.D.    Matthew Nassarsridevi OF STUDY:  10/27/2020    HOME SLEEP STUDY REPORT    REFERRING PROVIDER:  Willy Lira PA-C    HISTORY OF PRESENT ILLNESS:  The patient is a 33-year-old female,  brought to the sleep lab for sleep diagnostics. Weight 131 pounds,  height 67 inches, BMI 20.5. METHODS:  The patient underwent Type III Portable Monitoring Sleep Study  including the simultaneous recording of oral-nasal airflow, rib and  abdominal respiratory effort, pulse rate, oxygen saturation, left and  right leg movement, and body position. Scoring criteria is consistent  with the current published AASM standards for scoring of apneas and  hypopneas 4A. DETAILS OF THE STUDY:  The patient came by the sleep lab to  her  HST unit #230. She was given instructions how to set it up. The  patient returned the unit the next day. The information was  successfully downloaded and scored. Total recording time 399 minutes. Lights off time 11:27 p.m., lights on time 06:06 a.m. RESPIRATORY SUMMARY:  There were no obstructive or central events for an  HIRAL of 0. BODY POSITION SUMMARY:  290 minutes of supine time, 109 minutes of  non-supine  time, 6 minutes of upright time. ECG SUMMARY:  The patient remained in normal sinus rhythm through the  night. PULSE OXIMETRY SUMMARY:  Mean oxygen saturation 96.5%, lowest oxygen  saturation 94%. ASSESSMENT:  This is essentially a normal home sleep study. No  evidence of a sleep-disordered breathing.     RECOMMENDATIONS:  The patient will be followed up in the sleep clinic to  review results and plan of care.         Aden Velazquez M.D.    D: 11/08/2020 20:50:41       T: 11/09/2020 0:40:55     [email or web address]  Job#: [id number]     Doc#: [id number]    CC:

## 2020-11-10 NOTE — PROGRESS NOTES
[facility] Sleep [facility]    Laura Shirley, 37 y.o.  [id number]      Pt of [facility]  Nurses Notes   Karrie Lyn is being seen to go over HST results  Study Results  Initial Study Date -  10/27/20  AHI -  0.0    TotalEvents - 0  (Apneas  0  Hypopneas 0  Central  0)  LM w/Arousals - unknown  Sleep Efficiency - unknown % (Total Sleep Time - 399.7 min)  Time with Sats below 88% - 0 min  Interval History       Laura Shirley is a 37 y.o. old female who comes in to review the results of her recent sleep study, to answer questions. After loosing weight, she has not been able to tolerate PAP and was retested to see if NICOLE has resolved. She is sleeping well and feels rested without PAP. PMHx  Past Medical History:   Diagnosis Date    Insulin resistance     Sleep apnea      Past Surgical History:   Procedure Laterality Date    COLONOSCOPY      TONSILLECTOMY       Social History     Tobacco Use    Smoking status: Never Smoker    Smokeless tobacco: Never Used   Substance Use Topics    Alcohol use: No     Comment: occ    Drug use: No     Family History   Problem Relation Age of Onset    Diabetes Maternal Grandmother     Heart Disease Maternal Grandmother     High Blood Pressure Maternal Grandmother     Heart Attack Maternal Grandmother     Heart Failure Maternal Grandmother     Cancer Maternal Grandfather         lung, liver, bone, brain    Stroke Paternal Grandmother     Kidney Disease Brother      Allergies  No Known Allergies  Meds  Current Outpatient Medications   Medication Sig Dispense Refill    thyroid (ARMOUR THYROID) 60 MG tablet Take 1 tablet by mouth daily 30 tablet 1     No current facility-administered medications for this visit. ROS  Review of Systems   Constitutional: Negative for activity change, appetite change, chills and fever. HENT: Negative for congestion and postnasal drip. Eyes: Negative.     Respiratory: Negative for cough, chest tightness, shortness of breath, wheezing and stridor. Cardiovascular: Negative for chest pain and leg swelling. Gastrointestinal: Negative for diarrhea and nausea. Endocrine: Negative. Genitourinary: Negative. Musculoskeletal: Negative. Negative for arthralgias and back pain. Skin: Negative. Allergic/Immunologic: Negative. Neurological: Negative. Negative for dizziness and light-headedness. Psychiatric/Behavioral: Negative. All other systems reviewed and are negative. Exam  Vitals -  There were no vitals taken for this visit. There is no height or weight on file to calculate BMI. Oxygen level - Room Air  Physical Exam  Constitutional:       General: She is not in acute distress. Appearance: Normal appearance. She is normal weight. She is not ill-appearing. HENT:      Head: Normocephalic and atraumatic. Nose: Nose normal.   Eyes:      Extraocular Movements: Extraocular movements intact. Pupils: Pupils are equal, round, and reactive to light. Pulmonary:      Effort: Pulmonary effort is normal.   Neurological:      Mental Status: She is alert and oriented to person, place, and time. Psychiatric:         Attention and Perception: Attention and perception normal.         Mood and Affect: Mood and affect normal.         Speech: Speech normal.         Behavior: Behavior normal.         Thought Content: Thought content normal.         Cognition and Memory: Cognition and memory normal.         Judgment: Judgment normal.        Assessment   Diagnosis Orders   1. NICOLE (obstructive sleep apnea)     2. Exercise-induced asthma        Recommendations  NICOLE resolved on HST  Continue staying active  RTC if symptoms return  Follow up SEE Pinto  11/12/2020          Chris is being evaluated by a Virtual Visit (video visit) encounter to address concerns as mentioned above. A caregiver was present when appropriate.  Due to this being a TeleHealth encounter (During NHAXY-36 public health emergency), evaluation of the following organ systems was limited: Vitals/Constitutional/EENT/Resp/CV/GI//MS/Neuro/Skin/Heme-Lymph-Imm. Pursuant to the emergency declaration under the [facility], [address] [place] [address] authority and the [employer] Act, this Virtual Visit was conducted with patient's (and/or legal guardian's) consent, to reduce the patient's risk of exposure to COVID-19 and provide necessary medical care. The patient (and/or legal guardian) has also been advised to contact this office for worsening conditions or problems, and seek emergency medical treatment and/or call 911 if deemed necessary. Services were provided through a video synchronous discussion virtually to substitute for in-person clinic visit. Patient and provider were located at their individual homes. Patient identification was verified at the start of the visit. Total time spent on this encounter was 15 min     SEE Pisano  11/12/2020      An electronic signature was used to authenticate this note.

## 2020-11-12 ASSESSMENT — ENCOUNTER SYMPTOMS
NAUSEA: 0
EYES NEGATIVE: 1
STRIDOR: 0
CHEST TIGHTNESS: 0
SHORTNESS OF BREATH: 0
ALLERGIC/IMMUNOLOGIC NEGATIVE: 1
DIARRHEA: 0
WHEEZING: 0
COUGH: 0
BACK PAIN: 0

## 2020-11-13 ENCOUNTER — TELEMEDICINE (OUTPATIENT)
Dept: PULMONOLOGY | Age: 35
End: 2020-11-13
Payer: COMMERCIAL

## 2020-11-13 PROCEDURE — 99213 OFFICE O/P EST LOW 20 MIN: CPT | Performed by: PHYSICIAN ASSISTANT

## 2020-12-02 ENCOUNTER — PATIENT MESSAGE (OUTPATIENT)
Dept: FAMILY MEDICINE CLINIC | Age: 35
End: 2020-12-02

## 2020-12-02 NOTE — TELEPHONE ENCOUNTER
From: Kurt Burns  To: Zeinab Snow MD  Sent: 12/2/2020 11:26 AM EST  Subject: Non-Urgent Medical Question    I have an order for bloodwork to have my thyroid re-tested after starting my new meds. However, I have not yet started the new meds because I have been put on some other new medications from a GI doctor. I didn't want to try too many new meds at once. I would like to use the order I have to test my thyroid before starting the new med to see where it is without medication now. Will that be ok? Also, while I am going to get bloodwork, I was wondering if I could get an order to have a draw taken to test for the COVID antibodies. (I believe this it tested thru blood draw. ?? ) If so, please fax it to [address] [place] [address]. Please let me know. Thanks so much!

## 2020-12-02 NOTE — TELEPHONE ENCOUNTER
Ok for COVID antibody testing if she wants. Ok to repeat labs before starting new med if she prefers.  CG

## 2020-12-04 LAB
SARS-COV-2, IGG: REACTIVE
T4 FREE: 0.77 NG/DL (ref 0.61–1.12)
TSH SERPL DL<=0.05 MIU/L-ACNC: 4.47 UIU/ML (ref 0.49–4.67)

## 2020-12-05 LAB — T3 TOTAL: 84 NG/DL (ref 72–181)

## 2020-12-07 ENCOUNTER — TELEPHONE (OUTPATIENT)
Dept: FAMILY MEDICINE CLINIC | Age: 35
End: 2020-12-07

## 2020-12-07 NOTE — TELEPHONE ENCOUNTER
Ok to recheck TSH, free T4 if she wants. My apologies, I misread her antibody test results. Her antibodies are positive, confirming a current or previous infection with COVID 19.   CG

## 2020-12-07 NOTE — TELEPHONE ENCOUNTER
Pt states she was taking Biotin and belives it may have affected her thyroid test.Pt would like thyroid rechecked. Pt also states her Covid antibody shows positive on my chart. But results note says negative.  Please advise

## 2020-12-07 NOTE — TELEPHONE ENCOUNTER
----- Message from Bo Redmond MD sent at 12/6/2020  3:31 PM EST -----  Notify her that her COVID antibody test is negative. Thyroid numbers look good at this point. If she is off of her thyroid medication, have her stay off of it for now. Update med list please.  CG

## 2021-01-04 LAB
T4 FREE: 0.72 NG/DL (ref 0.61–1.12)
TSH SERPL DL<=0.05 MIU/L-ACNC: 7.25 UIU/ML (ref 0.49–4.67)

## 2021-01-05 ENCOUNTER — TELEPHONE (OUTPATIENT)
Dept: FAMILY MEDICINE CLINIC | Age: 36
End: 2021-01-05

## 2021-01-05 NOTE — TELEPHONE ENCOUNTER
----- Message from Chilo Bowman MD sent at 1/5/2021  8:12 AM EST -----  TSH elevated and free T4 is in the low normal range. Has she seen endo yet?  CG

## 2021-01-13 ENCOUNTER — PATIENT MESSAGE (OUTPATIENT)
Dept: FAMILY MEDICINE CLINIC | Age: 36
End: 2021-01-13

## 2021-01-13 RX ORDER — NITROFURANTOIN 25; 75 MG/1; MG/1
100 CAPSULE ORAL 2 TIMES DAILY
Qty: 14 CAPSULE | Refills: 0 | Status: SHIPPED | OUTPATIENT
Start: 2021-01-13 | End: 2021-01-20

## 2021-01-13 NOTE — TELEPHONE ENCOUNTER
From: Tree Santillan  To: Jose Polanco MD  Sent: 1/13/2021 8:54 AM EST  Subject: Non-Urgent Medical Question    I am on vacation in [place]. I'm 99.9% sure I've developed a UTI. Is it possible to get a prescription sent to a local [facility] down here? I've had several UTIs in the past and the symptoms are no different. Frequent urination and burning. If possible, please send to [facility] on [address] [place] [address]. Store #[id number]    Thank you!

## 2021-01-14 NOTE — TELEPHONE ENCOUNTER
Rx EP'd to pharmacy. Patient notified.     Requested Prescriptions     Signed Prescriptions Disp Refills    nitrofurantoin, macrocrystal-monohydrate, (MACROBID) 100 MG capsule 14 capsule 0     Sig: Take 1 capsule by mouth 2 times daily for 7 days     Authorizing Provider: Lori Khoury           Electronically signed by Jassi Schrader MD on 1/13/2021 at 9:10 PM

## 2021-03-26 ENCOUNTER — TELEPHONE (OUTPATIENT)
Dept: FAMILY MEDICINE CLINIC | Age: 36
End: 2021-03-26

## 2021-03-26 DIAGNOSIS — R63.5 WEIGHT GAIN: ICD-10-CM

## 2021-03-26 DIAGNOSIS — E03.9 HYPOTHYROIDISM, UNSPECIFIED TYPE: Primary | ICD-10-CM

## 2021-03-26 NOTE — TELEPHONE ENCOUNTER
Pt calling requesting a new referral for Dr Tyrel Castro in [facility] for endo.  Okay for new referral?    Fax to [phone]-

## 2021-04-10 ENCOUNTER — HOSPITAL ENCOUNTER (OUTPATIENT)
Age: 36
Discharge: HOME | End: 2021-04-10
Payer: COMMERCIAL

## 2021-04-10 LAB
AVERAGE GLUCOSE: 81 MG/DL (ref 70–126)
HBA1C MFR BLD: 4.7 % (ref 4.4–6.4)
T4 FREE: 1.09 NG/DL (ref 0.93–1.76)
TSH SERPL DL<=0.05 MIU/L-ACNC: 6.83 UIU/ML (ref 0.4–4.2)

## 2021-04-10 PROCEDURE — 82652 VIT D 1 25-DIHYDROXY: CPT

## 2021-04-10 PROCEDURE — 36415 COLL VENOUS BLD VENIPUNCTURE: CPT

## 2021-04-10 PROCEDURE — 84443 ASSAY THYROID STIM HORMONE: CPT

## 2021-04-10 PROCEDURE — 83525 ASSAY OF INSULIN: CPT

## 2021-04-10 PROCEDURE — 84439 ASSAY OF FREE THYROXINE: CPT

## 2021-04-10 PROCEDURE — 84481 FREE ASSAY (FT-3): CPT

## 2021-04-10 PROCEDURE — 83036 HEMOGLOBIN GLYCOSYLATED A1C: CPT

## 2021-04-11 LAB — INSULIN, RANDOM OR FASTING: 3.3 MU/L

## 2021-04-12 LAB — T3 FREE: 2.14 PG/ML (ref 2.02–4.43)

## 2021-04-14 LAB — VITAMIN D 1,25-DIHYDROXY: 64.1 PG/ML (ref 19.9–79.3)

## 2021-08-28 ENCOUNTER — HOSPITAL ENCOUNTER (OUTPATIENT)
Age: 36
Discharge: HOME | End: 2021-08-28
Payer: COMMERCIAL

## 2021-08-28 LAB
T3 TOTAL: 80 NG/DL (ref 72–181)
T4 FREE: 1.36 NG/DL (ref 0.93–1.76)
TSH SERPL DL<=0.05 MIU/L-ACNC: 2.67 UIU/ML (ref 0.4–4.2)

## 2021-08-28 PROCEDURE — 84439 ASSAY OF FREE THYROXINE: CPT

## 2021-08-28 PROCEDURE — 84480 ASSAY TRIIODOTHYRONINE (T3): CPT

## 2021-08-28 PROCEDURE — 84443 ASSAY THYROID STIM HORMONE: CPT

## 2021-08-28 PROCEDURE — 36415 COLL VENOUS BLD VENIPUNCTURE: CPT

## 2021-09-22 NOTE — TELEPHONE ENCOUNTER
Addended by: PATRICE JI on: 9/22/2021 03:53 PM     Modules accepted: Level of Service     Patient notified. She would like to have TFTs ordered. Lab order mailed.

## 2021-11-08 RX ORDER — LACTULOSE 10 G/15ML
20 SOLUTION ORAL 2 TIMES DAILY
COMMUNITY

## 2021-11-08 NOTE — PROGRESS NOTES
In preparation for their surgical procedure above patient was screened for Obstructive Sleep Apnea (NICOLE) using the STOP-Bang Questionnaire by the Pre-Admission Testing department. This is a pre-surgical screening tool for patient safety and serves as a recommendation, this WILL NOT cause cancellation of surgery. STOP-Bang Questionnaire  * Do you currently see a pulmonologist?  No     If yes STOP, do not complete. Patient follows with Dr.     1.  Do you snore loudly (able to be heard in the next room)? No    2. Do you often feel tired or sleepy during the daytime? No       3. Has anyone ever told you that you stop breathing during your sleep? No    4. Do you have or are you being treated for high blood pressure? No      5. BMI more than 35? BMI (Calculated): 22.6        No    6. Age over 48 years? 38 y.o. No    7. Neck Circumference greater than 17 inches for male or 16 inches for female? Measured           (visits only)            Not Applicable    8. Gender Male? No      TOTAL SCORE: 0    NICOLE - Low Risk : Yes to 0 - 2 questions  NICOLE - Intermediate Risk : Yes to 3 - 4 questions  NICOLE - High Risk : Yes to 5 - 8 questions    Adapted from:   STOP Questionnaire: A Tool to Screen Patients for Obstructive Sleep Apnea   HEATHER Dawson.C.P.C., Ross Linton M.B.B.S., Archie Melendez M.D., Roxann Alberto. Bryce Davila, Ph.D., MERCY Quiroga.B.B.S., Margie Banda, M.Sc., Melinda Lozada M.D., Luly Cardenas. SHELBY GordonC.P.C.    Anesthesiology 2008; [id number]:[id number] Copyright 2008, the [address] of [employer]est[employer], [facility] [[email or web address]] 37.   ----------------------------------------------------------------------------------------------------------------

## 2021-11-08 NOTE — PROGRESS NOTES
Covid screening questionnaire complete and negative for symptoms or exposure see chart for documentation.   Please notify your doctor if you develop any signs of illness  Please wear a mask when you come to the hospital    Following instructions given to patient, who states understanding:    NPO after midnight  Bring insurance info and 's license  Wear comfortable clean clothing  Do not bring jewelry   Shower night before and morning of surgery with a liquid antibacterial soap  Bring medications in original bottles  Follow all instructions given by your physician   needed at discharge  Call [phone] for any questions  Report to SDS on 2nd floor  If you would become ill prior to surgery, please call the surgeon  May have a visitor with you, we request that you limit to 2 visitors in pre-op area  Please bring and wear mask

## 2021-11-12 ENCOUNTER — ANESTHESIA (OUTPATIENT)
Dept: OPERATING ROOM | Age: 36
End: 2021-11-12
Payer: COMMERCIAL

## 2021-11-12 ENCOUNTER — HOSPITAL ENCOUNTER (OUTPATIENT)
Age: 36
Setting detail: OUTPATIENT SURGERY
Discharge: HOME | End: 2021-11-12
Attending: OBSTETRICS & GYNECOLOGY | Admitting: OBSTETRICS & GYNECOLOGY
Payer: COMMERCIAL

## 2021-11-12 ENCOUNTER — ANESTHESIA EVENT (OUTPATIENT)
Dept: OPERATING ROOM | Age: 36
End: 2021-11-12
Payer: COMMERCIAL

## 2021-11-12 VITALS
OXYGEN SATURATION: 100 % | BODY MASS INDEX: 22.73 KG/M2 | HEART RATE: 57 BPM | TEMPERATURE: 96.7 F | WEIGHT: 141.4 LBS | RESPIRATION RATE: 16 BRPM | SYSTOLIC BLOOD PRESSURE: 109 MMHG | DIASTOLIC BLOOD PRESSURE: 59 MMHG | HEIGHT: 66 IN

## 2021-11-12 VITALS — SYSTOLIC BLOOD PRESSURE: 90 MMHG | TEMPERATURE: 96.3 F | DIASTOLIC BLOOD PRESSURE: 56 MMHG | OXYGEN SATURATION: 100 %

## 2021-11-12 DIAGNOSIS — G89.18 ACUTE POSTOPERATIVE PAIN: Primary | ICD-10-CM

## 2021-11-12 LAB — PREGNANCY, URINE: NEGATIVE

## 2021-11-12 PROCEDURE — 7100000001 HC PACU RECOVERY - ADDTL 15 MIN: Performed by: OBSTETRICS & GYNECOLOGY

## 2021-11-12 PROCEDURE — 6360000002 HC RX W HCPCS: Performed by: ANESTHESIOLOGY

## 2021-11-12 PROCEDURE — 6360000002 HC RX W HCPCS: Performed by: OBSTETRICS & GYNECOLOGY

## 2021-11-12 PROCEDURE — 2709999900 HC NON-CHARGEABLE SUPPLY: Performed by: OBSTETRICS & GYNECOLOGY

## 2021-11-12 PROCEDURE — 7100000000 HC PACU RECOVERY - FIRST 15 MIN: Performed by: OBSTETRICS & GYNECOLOGY

## 2021-11-12 PROCEDURE — 3700000001 HC ADD 15 MINUTES (ANESTHESIA): Performed by: OBSTETRICS & GYNECOLOGY

## 2021-11-12 PROCEDURE — 2500000003 HC RX 250 WO HCPCS: Performed by: OBSTETRICS & GYNECOLOGY

## 2021-11-12 PROCEDURE — S2900 ROBOTIC SURGICAL SYSTEM: HCPCS | Performed by: OBSTETRICS & GYNECOLOGY

## 2021-11-12 PROCEDURE — 51798 US URINE CAPACITY MEASURE: CPT

## 2021-11-12 PROCEDURE — 6370000000 HC RX 637 (ALT 250 FOR IP): Performed by: OBSTETRICS & GYNECOLOGY

## 2021-11-12 PROCEDURE — 3700000000 HC ANESTHESIA ATTENDED CARE: Performed by: OBSTETRICS & GYNECOLOGY

## 2021-11-12 PROCEDURE — 2500000003 HC RX 250 WO HCPCS: Performed by: NURSE ANESTHETIST, CERTIFIED REGISTERED

## 2021-11-12 PROCEDURE — 6360000002 HC RX W HCPCS: Performed by: NURSE ANESTHETIST, CERTIFIED REGISTERED

## 2021-11-12 PROCEDURE — 2580000003 HC RX 258: Performed by: OBSTETRICS & GYNECOLOGY

## 2021-11-12 PROCEDURE — 88305 TISSUE EXAM BY PATHOLOGIST: CPT

## 2021-11-12 PROCEDURE — 3600000009 HC SURGERY ROBOT BASE: Performed by: OBSTETRICS & GYNECOLOGY

## 2021-11-12 PROCEDURE — 81025 URINE PREGNANCY TEST: CPT

## 2021-11-12 PROCEDURE — 7100000010 HC PHASE II RECOVERY - FIRST 15 MIN: Performed by: OBSTETRICS & GYNECOLOGY

## 2021-11-12 PROCEDURE — 3600000019 HC SURGERY ROBOT ADDTL 15MIN: Performed by: OBSTETRICS & GYNECOLOGY

## 2021-11-12 PROCEDURE — C1765 ADHESION BARRIER: HCPCS | Performed by: OBSTETRICS & GYNECOLOGY

## 2021-11-12 PROCEDURE — 7100000011 HC PHASE II RECOVERY - ADDTL 15 MIN: Performed by: OBSTETRICS & GYNECOLOGY

## 2021-11-12 DEVICE — BARRIER ADH W3XL4IN UTER PELV ABSRB GYNECARE INTCEED: Type: IMPLANTABLE DEVICE | Site: UTERUS | Status: FUNCTIONAL

## 2021-11-12 RX ORDER — SODIUM CHLORIDE 0.9 % (FLUSH) 0.9 %
5-40 SYRINGE (ML) INJECTION EVERY 12 HOURS SCHEDULED
Status: DISCONTINUED | OUTPATIENT
Start: 2021-11-12 | End: 2021-11-12 | Stop reason: HOSPADM

## 2021-11-12 RX ORDER — HYDROCODONE BITARTRATE AND ACETAMINOPHEN 5; 325 MG/1; MG/1
1 TABLET ORAL EVERY 4 HOURS PRN
Status: DISCONTINUED | OUTPATIENT
Start: 2021-11-12 | End: 2021-11-12 | Stop reason: HOSPADM

## 2021-11-12 RX ORDER — SODIUM CHLORIDE, SODIUM LACTATE, POTASSIUM CHLORIDE, CALCIUM CHLORIDE 600; 310; 30; 20 MG/100ML; MG/100ML; MG/100ML; MG/100ML
INJECTION, SOLUTION INTRAVENOUS CONTINUOUS
Status: DISCONTINUED | OUTPATIENT
Start: 2021-11-12 | End: 2021-11-12

## 2021-11-12 RX ORDER — SODIUM CHLORIDE 9 MG/ML
25 INJECTION, SOLUTION INTRAVENOUS PRN
Status: DISCONTINUED | OUTPATIENT
Start: 2021-11-12 | End: 2021-11-12 | Stop reason: HOSPADM

## 2021-11-12 RX ORDER — KETOROLAC TROMETHAMINE 30 MG/ML
30 INJECTION, SOLUTION INTRAMUSCULAR; INTRAVENOUS EVERY 6 HOURS
Status: DISCONTINUED | OUTPATIENT
Start: 2021-11-12 | End: 2021-11-12 | Stop reason: HOSPADM

## 2021-11-12 RX ORDER — SODIUM CHLORIDE 0.9 % (FLUSH) 0.9 %
10 SYRINGE (ML) INJECTION EVERY 12 HOURS SCHEDULED
Status: DISCONTINUED | OUTPATIENT
Start: 2021-11-12 | End: 2021-11-12

## 2021-11-12 RX ORDER — PROMETHAZINE HYDROCHLORIDE 25 MG/ML
6.25 INJECTION, SOLUTION INTRAMUSCULAR; INTRAVENOUS
Status: DISCONTINUED | OUTPATIENT
Start: 2021-11-12 | End: 2021-11-12 | Stop reason: HOSPADM

## 2021-11-12 RX ORDER — ONDANSETRON 4 MG/1
4 TABLET, ORALLY DISINTEGRATING ORAL EVERY 8 HOURS PRN
Status: DISCONTINUED | OUTPATIENT
Start: 2021-11-12 | End: 2021-11-12 | Stop reason: HOSPADM

## 2021-11-12 RX ORDER — BUPIVACAINE HYDROCHLORIDE 5 MG/ML
INJECTION, SOLUTION EPIDURAL; INTRACAUDAL PRN
Status: DISCONTINUED | OUTPATIENT
Start: 2021-11-12 | End: 2021-11-12 | Stop reason: ALTCHOICE

## 2021-11-12 RX ORDER — PROPOFOL 10 MG/ML
INJECTION, EMULSION INTRAVENOUS PRN
Status: DISCONTINUED | OUTPATIENT
Start: 2021-11-12 | End: 2021-11-12 | Stop reason: SDUPTHER

## 2021-11-12 RX ORDER — ROCURONIUM BROMIDE 10 MG/ML
INJECTION, SOLUTION INTRAVENOUS PRN
Status: DISCONTINUED | OUTPATIENT
Start: 2021-11-12 | End: 2021-11-12 | Stop reason: SDUPTHER

## 2021-11-12 RX ORDER — ONDANSETRON 2 MG/ML
4 INJECTION INTRAMUSCULAR; INTRAVENOUS
Status: COMPLETED | OUTPATIENT
Start: 2021-11-12 | End: 2021-11-12

## 2021-11-12 RX ORDER — SODIUM CHLORIDE 0.9 % (FLUSH) 0.9 %
10 SYRINGE (ML) INJECTION PRN
Status: DISCONTINUED | OUTPATIENT
Start: 2021-11-12 | End: 2021-11-12

## 2021-11-12 RX ORDER — SODIUM CHLORIDE 9 MG/ML
25 INJECTION, SOLUTION INTRAVENOUS PRN
Status: DISCONTINUED | OUTPATIENT
Start: 2021-11-12 | End: 2021-11-12

## 2021-11-12 RX ORDER — HYDROCODONE BITARTRATE AND ACETAMINOPHEN 5; 325 MG/1; MG/1
1 TABLET ORAL EVERY 4 HOURS PRN
Qty: 20 TABLET | Refills: 0 | Status: SHIPPED | OUTPATIENT
Start: 2021-11-12 | End: 2021-11-19

## 2021-11-12 RX ORDER — MIDAZOLAM HYDROCHLORIDE 1 MG/ML
INJECTION INTRAMUSCULAR; INTRAVENOUS PRN
Status: DISCONTINUED | OUTPATIENT
Start: 2021-11-12 | End: 2021-11-12 | Stop reason: SDUPTHER

## 2021-11-12 RX ORDER — HYDROCODONE BITARTRATE AND ACETAMINOPHEN 5; 325 MG/1; MG/1
2 TABLET ORAL EVERY 4 HOURS PRN
Status: DISCONTINUED | OUTPATIENT
Start: 2021-11-12 | End: 2021-11-12 | Stop reason: HOSPADM

## 2021-11-12 RX ORDER — IBUPROFEN 600 MG/1
600 TABLET ORAL EVERY 6 HOURS PRN
Qty: 60 TABLET | Refills: 1 | Status: SHIPPED | OUTPATIENT
Start: 2021-11-12

## 2021-11-12 RX ORDER — FENTANYL CITRATE 50 UG/ML
25 INJECTION, SOLUTION INTRAMUSCULAR; INTRAVENOUS EVERY 5 MIN PRN
Status: DISCONTINUED | OUTPATIENT
Start: 2021-11-12 | End: 2021-11-12 | Stop reason: HOSPADM

## 2021-11-12 RX ORDER — ACETAMINOPHEN 325 MG/1
650 TABLET ORAL EVERY 4 HOURS PRN
Status: DISCONTINUED | OUTPATIENT
Start: 2021-11-12 | End: 2021-11-12 | Stop reason: HOSPADM

## 2021-11-12 RX ORDER — LABETALOL 20 MG/4 ML (5 MG/ML) INTRAVENOUS SYRINGE
5 EVERY 10 MIN PRN
Status: DISCONTINUED | OUTPATIENT
Start: 2021-11-12 | End: 2021-11-12 | Stop reason: HOSPADM

## 2021-11-12 RX ORDER — SODIUM CHLORIDE 0.9 % (FLUSH) 0.9 %
5-40 SYRINGE (ML) INJECTION PRN
Status: DISCONTINUED | OUTPATIENT
Start: 2021-11-12 | End: 2021-11-12 | Stop reason: HOSPADM

## 2021-11-12 RX ORDER — ONDANSETRON 2 MG/ML
4 INJECTION INTRAMUSCULAR; INTRAVENOUS EVERY 6 HOURS PRN
Status: DISCONTINUED | OUTPATIENT
Start: 2021-11-12 | End: 2021-11-12 | Stop reason: HOSPADM

## 2021-11-12 RX ORDER — FENTANYL CITRATE 50 UG/ML
50 INJECTION, SOLUTION INTRAMUSCULAR; INTRAVENOUS EVERY 5 MIN PRN
Status: DISCONTINUED | OUTPATIENT
Start: 2021-11-12 | End: 2021-11-12 | Stop reason: HOSPADM

## 2021-11-12 RX ADMIN — FENTANYL CITRATE 50 MCG: 50 INJECTION, SOLUTION INTRAMUSCULAR; INTRAVENOUS at 15:15

## 2021-11-12 RX ADMIN — ONDANSETRON HYDROCHLORIDE 4 MG: 4 INJECTION, SOLUTION INTRAMUSCULAR; INTRAVENOUS at 13:31

## 2021-11-12 RX ADMIN — FENTANYL CITRATE 100 MCG: 50 INJECTION, SOLUTION INTRAMUSCULAR; INTRAVENOUS at 13:30

## 2021-11-12 RX ADMIN — MIDAZOLAM 2 MG: 1 INJECTION INTRAMUSCULAR; INTRAVENOUS at 13:11

## 2021-11-12 RX ADMIN — ROCURONIUM BROMIDE 50 MG: 10 INJECTION INTRAVENOUS at 13:29

## 2021-11-12 RX ADMIN — HYDROMORPHONE HYDROCHLORIDE 0.5 MG: 1 INJECTION, SOLUTION INTRAMUSCULAR; INTRAVENOUS; SUBCUTANEOUS at 15:08

## 2021-11-12 RX ADMIN — SODIUM CHLORIDE: 9 INJECTION, SOLUTION INTRAVENOUS at 13:40

## 2021-11-12 RX ADMIN — HYDROMORPHONE HYDROCHLORIDE 0.5 MG: 1 INJECTION, SOLUTION INTRAMUSCULAR; INTRAVENOUS; SUBCUTANEOUS at 15:03

## 2021-11-12 RX ADMIN — PROPOFOL 200 MG: 10 INJECTION, EMULSION INTRAVENOUS at 13:26

## 2021-11-12 RX ADMIN — FENTANYL CITRATE 50 MCG: 50 INJECTION, SOLUTION INTRAMUSCULAR; INTRAVENOUS at 15:20

## 2021-11-12 RX ADMIN — KETOROLAC TROMETHAMINE 30 MG: 30 INJECTION, SOLUTION INTRAMUSCULAR; INTRAVENOUS at 18:06

## 2021-11-12 RX ADMIN — SODIUM CHLORIDE 25 ML: 9 INJECTION, SOLUTION INTRAVENOUS at 10:01

## 2021-11-12 RX ADMIN — ROCURONIUM BROMIDE 25 MG: 10 INJECTION INTRAVENOUS at 14:08

## 2021-11-12 RX ADMIN — ONDANSETRON 4 MG: 4 TABLET, ORALLY DISINTEGRATING ORAL at 16:19

## 2021-11-12 RX ADMIN — CEFAZOLIN 2000 MG: 10 INJECTION, POWDER, FOR SOLUTION INTRAVENOUS at 13:30

## 2021-11-12 RX ADMIN — SODIUM CHLORIDE: 9 INJECTION, SOLUTION INTRAVENOUS at 13:26

## 2021-11-12 RX ADMIN — HYDROCODONE BITARTRATE AND ACETAMINOPHEN 1 TABLET: 5; 325 TABLET ORAL at 16:19

## 2021-11-12 ASSESSMENT — PULMONARY FUNCTION TESTS
PIF_VALUE: 24
PIF_VALUE: 23
PIF_VALUE: 16
PIF_VALUE: 5
PIF_VALUE: 23
PIF_VALUE: 23
PIF_VALUE: 15
PIF_VALUE: 0
PIF_VALUE: 21
PIF_VALUE: 23
PIF_VALUE: 17
PIF_VALUE: 16
PIF_VALUE: 24
PIF_VALUE: 2
PIF_VALUE: 16
PIF_VALUE: 14
PIF_VALUE: 23
PIF_VALUE: 23
PIF_VALUE: 6
PIF_VALUE: 16
PIF_VALUE: 20
PIF_VALUE: 15
PIF_VALUE: 23
PIF_VALUE: 23
PIF_VALUE: 13
PIF_VALUE: 8
PIF_VALUE: 1
PIF_VALUE: 19
PIF_VALUE: 1
PIF_VALUE: 16
PIF_VALUE: 23
PIF_VALUE: 1
PIF_VALUE: 23
PIF_VALUE: 19
PIF_VALUE: 23
PIF_VALUE: 24
PIF_VALUE: 24
PIF_VALUE: 3
PIF_VALUE: 1
PIF_VALUE: 1
PIF_VALUE: 14
PIF_VALUE: 5
PIF_VALUE: 24
PIF_VALUE: 5
PIF_VALUE: 16
PIF_VALUE: 23
PIF_VALUE: 23
PIF_VALUE: 19
PIF_VALUE: 13
PIF_VALUE: 23
PIF_VALUE: 23
PIF_VALUE: 17
PIF_VALUE: 9
PIF_VALUE: 18
PIF_VALUE: 23
PIF_VALUE: 15
PIF_VALUE: 1
PIF_VALUE: 16
PIF_VALUE: 23
PIF_VALUE: 18
PIF_VALUE: 16
PIF_VALUE: 23
PIF_VALUE: 18
PIF_VALUE: 24
PIF_VALUE: 17
PIF_VALUE: 16
PIF_VALUE: 16
PIF_VALUE: 0
PIF_VALUE: 0
PIF_VALUE: 20
PIF_VALUE: 16
PIF_VALUE: 14
PIF_VALUE: 23
PIF_VALUE: 17
PIF_VALUE: 13
PIF_VALUE: 14
PIF_VALUE: 16
PIF_VALUE: 24
PIF_VALUE: 1
PIF_VALUE: 24
PIF_VALUE: 1
PIF_VALUE: 24
PIF_VALUE: 16
PIF_VALUE: 17
PIF_VALUE: 4
PIF_VALUE: 13
PIF_VALUE: 14
PIF_VALUE: 24
PIF_VALUE: 16
PIF_VALUE: 16
PIF_VALUE: 23
PIF_VALUE: 1
PIF_VALUE: 1
PIF_VALUE: 2
PIF_VALUE: 16
PIF_VALUE: 15
PIF_VALUE: 23
PIF_VALUE: 14
PIF_VALUE: 13
PIF_VALUE: 1
PIF_VALUE: 0
PIF_VALUE: 23
PIF_VALUE: 23
PIF_VALUE: 24
PIF_VALUE: 0
PIF_VALUE: 23

## 2021-11-12 ASSESSMENT — PAIN SCALES - GENERAL
PAINLEVEL_OUTOF10: 4
PAINLEVEL_OUTOF10: 5
PAINLEVEL_OUTOF10: 8
PAINLEVEL_OUTOF10: 3
PAINLEVEL_OUTOF10: 5
PAINLEVEL_OUTOF10: 7
PAINLEVEL_OUTOF10: 7
PAINLEVEL_OUTOF10: 5
PAINLEVEL_OUTOF10: 8
PAINLEVEL_OUTOF10: 7
PAINLEVEL_OUTOF10: 3
PAINLEVEL_OUTOF10: 7

## 2021-11-12 ASSESSMENT — PAIN - FUNCTIONAL ASSESSMENT: PAIN_FUNCTIONAL_ASSESSMENT: 0-10

## 2021-11-12 NOTE — PROGRESS NOTES
Pt unable to void after trying. Fresh iv bag and ice water given to patient. Ambulated in hallway with no difficulty.

## 2021-11-12 NOTE — ANESTHESIA POSTPROCEDURE EVALUATION
Department of Anesthesiology  Postprocedure Note    Patient: Kellie Park  MRN: [id number]  YOB: 1985  Date of evaluation: 11/12/2021  Time:  4:19 PM     Procedure Summary     Date: 11/12/21 Room / Location: [address] [facility]    Anesthesia Start: 1304 Anesthesia Stop: 7456    Procedure: ROBOTIC TOTAL HYSTERECTOMY WITH BILAT SALPINGECTOMY (N/A Uterus) Diagnosis: (CYSTOCELE, UTERINE PROLAPSE, RECTOCELE)    Surgeons: Yuri Rodriguez DO Responsible Provider: Crystal Nascimento MD    Anesthesia Type: General ASA Status: 1          Anesthesia Type: General    Cornelia Phase I: Cornelia Score: 9    Cornelia Phase II: Cornelia Score: 10    Last vitals: Reviewed and per EMR flowsheets.        Anesthesia Post Evaluation    Patient location during evaluation: PACU  Patient participation: complete - patient participated  Level of consciousness: awake and alert  Airway patency: patent  Nausea & Vomiting: no vomiting and no nausea  Complications: no  Cardiovascular status: hemodynamically stable  Respiratory status: acceptable  Hydration status: stable

## 2021-11-12 NOTE — PROGRESS NOTES
Pt returned to [facility] room 1. Vitals and assessment as charted. 0.9 infusing, @950ml to count from PACU. Pt has applesauce and water. Family at the bedside. Pt and family verbalized understanding of discharge criteria and call light use. Call light in reach.

## 2021-11-12 NOTE — PROGRESS NOTES
Pt and family oriented to SDS 1 and unit. Pt verbalized approval for first name, last initial and physician name on unit whiteboard. Fall band applied. Vaccine information given. Plan of care reviewed.

## 2021-11-12 NOTE — INTERVAL H&P NOTE
[address] [address]  History and Physical Update    Pt Name: Eddie Stearns  MRN: [id number]  YOB: 1985  Date of evaluation: 11/12/2021    I have examined the patient and reviewed the H&P/Consult and there are no changes to the patient or plans.       Indigo Baeza DO  Electronically signed 11/12/2021 at 10:58 AM

## 2021-11-12 NOTE — H&P
Women's Health for [address] and Physical    Patient Name: Katharine Sauceda   Patient ID: [id number]   Sex: Female   YOB: 1985         Visit Date: 2021    Provider: Alonzo Pabon DO   Location: [facility] Office   Location Address: [facility] [address], [facility]   Location Phone: [phone]          Chief Complaint   Heavy Periods   Irregular vaginal bleeding       History Of Present Illness   The patient returns today, 2021, without significant changes in her history from above. She presents for a preop visit for Hysterectomy with BS for menometrorrhagia. The risks of the procedure were reviewed including infection, hemorrhage, DVT, injury to bladder or bowel or ureter, perforation of the uterus, possible blood transfusion, and wound complication. Alternatives to the procedure were also discussed including doing nothing and medical treatments. The procedure was reviewed in detail as well as what to expect postoperatively. All the patient's questions were answered and she demonstrated an understanding of our discussion. This is a 38year old /White female , who presents for the evaluation of heavy periods. She states she has been having problems with her periods for the past 13 months. She indicates the flow has been worsening over the past 6 months. Her last menstrual period was 10/06/2021 and lasted for 16 days. Her periods occur approximately every 28 days and lasts for 10 - 12 days. She notes her most recent period was longer than usual and light. On her heaviest days she will change a super plus pad every 1-2 hours. She flows heavy for approximately 2 days out of each period. Per patient she has always had irregular periods. She has had a blood count since developing this problem and the results showed no anemia The patient indicates she has had a pelvic ultrasound which showed ovarian cysts and a RV uterus.  Her Notes   Family history of diabetes mellitus Grandmother (paternal)/   --    Family History of Heart Disease Father/   --    Family History of Fibromyalgia Mother/   --    Family History of Blood Disorder Grandfather (paternal)/   polycythemia vera blood disorder   Family history of Brain Cancer Grandfather (maternal)/   --    Family history of colon cancer Grandfather (maternal)/   --    Family history of hypercholesterolemia Father/   --    Family history of heart failure Grandmother (maternal)/   --    Family history of bone cancer Grandfather (maternal)/   --            Reproductive History   Menstrual   Age Menarche: 13 Menses Duration(Days): 9 Flow: Heavy   Last Menstrual Period: 07/15/2021 Menopause Status: Premenopausal Method of Birth Control: Withdrawal   Pregnancy Summary   Total Pregnancies: 3 Full Term: 3 Premature: 0   Ab Induced: 0 Ab Spontaneous: 0 Ectopics: 0   Multiples: 0 Living: 3   Pregnancy Details    Date GA Hrs Labor Birth Wt Sex Type Delivery Anes? Early Labor?  Comments/ Complications Location   04/06/2009 38  8lb 2lb Female Vaginal  No borderline GDM Dr Cesar Mackenzie   02/18/2011 38  9lb 3oz Female Vaginal  No borderline GDM Dr Sue Frost   06/20/2013 34  8lb Male Vaginal  No borderline GDM Dr Danielle Acevedo History   Finding Status Start/Stop Quantity Notes   Age of first sexual encounter --  --/-- --  15   Alcohol Current some day --/-- --  --    Denies emotional/verbal abuse --  --/-- --  --    Denies illicit substance abuse Never --/-- --  --    Denies physical abuse --  --/-- --  --    Denies Sexual Abuse --  --/-- --  --    Dr. Ivanna Buenrostro --  --/-- --  --    Lifetime partners --  --/-- --  2    --  --/-- --  --    Partners in the last 6 months --  --/-- --  1   Sales --  --/-- --  --    Tobacco Never --/-- --  --    Uses seat belts --  --/-- --  --            Review of Systems   ConstitutionalDenies : body aches, night sweats, loss of appetite   BreastsDenies : lumps, tenderness, swelling, nipple discharge   CardiovascularDenies : chest pain, irregular heart beats, rapid heart rate, syncope   RespiratoryDenies : shortness of breath, wheezing, cough   GastrointestinalDenies : nausea, vomiting, diarrhea, constipation, blood in stools   GenitourinaryDenies : urgency, frequency, dysuria, incontinence   NeurologicDenies : muscular weakness, incoordination, tingling or numbness   EndocrineDenies : polyuria, polydipsia, cold intolerance, heat intolerance   Heme-LymphDenies : easy bleeding, easy bruising, lymph node enlargement or tenderness       Vitals   Date Time BP Position Site L\R Cuff Size HR RR TEMP (F) WT  HT  BMI kg/m2 BSA m2 O2 Sat FR L/min FiO2 HC       11/04/2021 09:03 /66 Sitting       146lbs 0oz 5'  6\" 23.56 1.76               Physical Examination   ConstitutionalAppearance : well-nourished, well developed, alert, in no acute distress   EyesConjunctiva/Eyelids : conjunctiva normal, eyelid appearance normal   Sclera : sclera white   HENTHead and Face :   Head : normocephalic, atraumatic   NeckInspection/Palpation : normal appearance, no masses or tenderness   Range of Motion : neck supple with full range of motion   ChestRespiratory Effort : breathing unlabored   Auscultation : normal breath sounds, no rales, no rhonchi   Inspection of Chest : no lesions or traumatic injuries, no scars present   CardiovascularHeart :    Auscultation : regular rate, normal rhythm, no murmurs present   Peripheral Vascular System :   Peripheral Circulation : no edema, no cyanosis   GastrointestinalAbdominal Examination : abdomen nontender to palpation, normal bowel sounds, tone normal without rigidity or guarding, no masses present, umbilicus without lesions   Liver and spleen : no hepatomegaly present, liver nontender to palpation   Hernias : no hernias present   Neurologic/PsychiatricMental Status :   Orientation : grossly oriented to person, place and time   Memory : memory intact   Mood and

## 2021-11-13 NOTE — PROCEDURES
Bladder scan completed at 1915 and results told to [facility]. Scan showed 129 mL in the patients bladder. Last void was unknown.  Gustavo Chavis CET

## 2021-11-13 NOTE — PROGRESS NOTES
Pt able to urinate. Pt has met discharge criteria and states she is ready for discharge to home. IV removed, gauze and tape applied. Dressed in own clothes and personal belongings gathered. Discharge instructions (with opioid medication education information) given to pt and family; pt and family verbalized understanding of discharge instructions, prescriptions and follow up appointments. Pt transported to discharge lobby by [facility] staff.

## 2021-11-14 NOTE — OP NOTE
Department of Gynecology   Operative Report    PREOPERATIVE DIAGNOSES: menorrhagia with reg menses    POSTOPERATIVE DIAGNOSES:  Same    SURGEON: Beny Manzano DO    ASSISTANT: None    PROCEDURE: Robotic total laparoscopic hysterectomy with bilateral salpingectomy    FINDINGS: non-enlarged uterus, normal ovaries and fallopian tubes. Normal liver, appy. On completion of procedure, bilateral ureters were peristalsing well    ANESTHESIA: General    ESTIMATED BLOOD LOSS: 25 ml    SPECIMEN: uterus, cervix, and bilateral fallopian tubes    COMPLICATIONS: None    URINARY OUTPUT: 150 ml of clear urine at end of procedure    INDICATIONS: Pt is a 44yo female who presents for a robotic TLH w BS due to heavy bleeding despite hormonal meds and removal of a polyp. DETAILS OF PROCEDURE: The patient was taken to the operating room where general anesthesia was achieved without difficulty. The patient was placed in the dorsolithotomy position, prepped and draped in the normal sterile fashion. Weighted speculum was placed in the vagina, anterior lip of the cervix was grasped with a single-tooth tenaculum. A large VCare manipulator was placed without difficulty. Attention was then turned toward the abdomen and a 8 mm skin incision was made in the supraumbilical region and a 8 mm trocar was placed under direct visualization. Intra-abdominal placement was confirmed with the use of the laparoscope and attainment of the pneumoperitoneum. A survey of the patient's abdomen and pelvis revealed the above findings. 8 mm skin incisions were then made in the lateral sidewall and 8 mm robotic trocars were placed under direct visualization. A 8mm incision was made in the left upper quadrant and a 8 mm Airseal trocar was placed under direct visualization for an assistant port. The patient was then placed in the steep Trendelenburg position and the Da Denise robot was docked according to the 's guidelines.  Attention was first drawn to the left fallopian tube at the level of the infundibulopelvic ligamant and the branch to the tube was identified and doubly cauterized and cut. The mesosalpinx was taken down to the level of the ligament of the ovary and the round ligament which then were clamped, cauterized, and cut. The broad ligament was then taken down anteriorly and posteriorly, skeletonizing the uterine artery and began a bladder flap anteriorly. Uterine artery was then doubly cauterized and cut. The cardinal ligaments were then taken down to the level of the [employer]. Attention was then turned to the right fallopian tube which was treated in a similar fashion. Again, the round ligament and ligament of the ovary were then doubly cauterized and cut. The broad ligament was then taken down inferiorly completing the bladder flap then skeletonizing the uterine artery. The uterine artery was again doubly cauterized and cut. The cardinal ligaments were then taken down to the level of the [employer]. The cervix was then skeletonized and a circumferential incision was made amputating the cervix from the vaginal cuff. The specimen was then taken out through the vaginal cuff. Four Figure of eight stitches of 0 Vicryl were then placed in the vaginal cuff for closure. Excellent hemostasis was noted. Irrigation was performed. Intercede was then placed over the vaginal cuff to prevent further adhesions. All instruments were then removed from the abdomen. An effort was made to remove all CO2 gas. The skin incisions were then all closed with a 4-0 Vicryl in subcuticular manner. Mastisol and steri-strips were placed. The patient tolerated the procedure well. Sponge, instrument, and needle counts were correct. The patient was taken to the recovery room in stable condition.

## (undated) DEVICE — BLADELESS OBTURATOR: Brand: WECK VISTA

## (undated) DEVICE — CANNULA SEAL

## (undated) DEVICE — SUTURE VCRL + SZ 4-0 L27IN ABSRB WHT FS-2 3/8 CIR REV CUT VCP422H

## (undated) DEVICE — GLOVE ORANGE PI 7 1/2   MSG9075

## (undated) DEVICE — SUTURE VCRL + SZ 0 L27IN ABSRB VLT L26MM CT-2 1/2 CIR VCP334H

## (undated) DEVICE — ELECTRO LUBE IS A SINGLE PATIENT USE DEVICE THAT IS INTENDED TO BE USED ON ELECTROSURGICAL ELECTRODES TO REDUCE STICKING.: Brand: KEY SURGICAL ELECTRO LUBE

## (undated) DEVICE — COLUMN DRAPE

## (undated) DEVICE — BANDAGE ADH W1XL3IN NAT FAB WVN FLX DURABLE N ADH PD SEAL

## (undated) DEVICE — GLOVE ORANGE PI 7   MSG9070

## (undated) DEVICE — ARM DRAPE

## (undated) DEVICE — TRI-LUMEN FILTERED TUBE SET WITH ACTIVATED CHARCOAL FILTER: Brand: AIRSEAL

## (undated) DEVICE — BASIC SINGLE BASIN BTC-LF: Brand: MEDLINE INDUSTRIES, INC.

## (undated) DEVICE — PACK PROCEDURE SURG GYN ROBOTIC

## (undated) DEVICE — TROCAR: Brand: KII FIOS FIRST ENTRY

## (undated) DEVICE — TIP COVER ACCESSORY

## (undated) DEVICE — STRIP,CLOSURE,WOUND,MEDI-STRIP,1/2X4: Brand: MEDLINE

## (undated) DEVICE — SOLUTION IV 1000ML 0.9% SOD CHL PH 5 INJ USP VIAFLX PLAS